# Patient Record
Sex: FEMALE | Race: WHITE | NOT HISPANIC OR LATINO
[De-identification: names, ages, dates, MRNs, and addresses within clinical notes are randomized per-mention and may not be internally consistent; named-entity substitution may affect disease eponyms.]

---

## 2022-12-29 PROBLEM — Z00.00 ENCOUNTER FOR PREVENTIVE HEALTH EXAMINATION: Status: ACTIVE | Noted: 2022-12-29

## 2023-01-17 ENCOUNTER — NON-APPOINTMENT (OUTPATIENT)
Age: 78
End: 2023-01-17

## 2023-01-17 ENCOUNTER — APPOINTMENT (OUTPATIENT)
Dept: NEUROSURGERY | Facility: CLINIC | Age: 78
End: 2023-01-17
Payer: MEDICARE

## 2023-01-17 VITALS
OXYGEN SATURATION: 92 % | SYSTOLIC BLOOD PRESSURE: 136 MMHG | HEIGHT: 64 IN | HEART RATE: 65 BPM | BODY MASS INDEX: 26.46 KG/M2 | WEIGHT: 155 LBS | DIASTOLIC BLOOD PRESSURE: 84 MMHG

## 2023-01-17 DIAGNOSIS — Z83.3 FAMILY HISTORY OF DIABETES MELLITUS: ICD-10-CM

## 2023-01-17 DIAGNOSIS — R25.1 TREMOR, UNSPECIFIED: ICD-10-CM

## 2023-01-17 DIAGNOSIS — Z86.79 PERSONAL HISTORY OF OTHER DISEASES OF THE CIRCULATORY SYSTEM: ICD-10-CM

## 2023-01-17 PROCEDURE — 99204 OFFICE O/P NEW MOD 45 MIN: CPT

## 2023-01-17 RX ORDER — AMLODIPINE BESYLATE 10 MG/1
10 TABLET ORAL
Refills: 0 | Status: ACTIVE | COMMUNITY

## 2023-01-17 RX ORDER — ESCITALOPRAM OXALATE 5 MG/1
5 TABLET ORAL
Refills: 0 | Status: ACTIVE | COMMUNITY

## 2023-01-17 RX ORDER — ATORVASTATIN CALCIUM 10 MG/1
10 TABLET, FILM COATED ORAL
Refills: 0 | Status: ACTIVE | COMMUNITY

## 2023-01-17 RX ORDER — CETIRIZINE HYDROCHLORIDE 10 MG/1
10 TABLET, COATED ORAL
Refills: 0 | Status: ACTIVE | COMMUNITY

## 2023-01-17 RX ORDER — CHROMIUM 200 MCG
TABLET ORAL
Refills: 0 | Status: ACTIVE | COMMUNITY

## 2023-01-17 RX ORDER — HYDROCHLOROTHIAZIDE 25 MG/1
25 TABLET ORAL
Refills: 0 | Status: ACTIVE | COMMUNITY

## 2023-01-17 RX ORDER — LOSARTAN POTASSIUM 100 MG/1
100 TABLET, FILM COATED ORAL
Refills: 0 | Status: ACTIVE | COMMUNITY

## 2023-01-18 NOTE — ASSESSMENT
[FreeTextEntry1] : IMPRESSION:\par 77F with PMH of HTN. P/w tremor in face, lip, jaw, head, and right hand. Tremor worsens with anxiety and stress, relieved with occasional alcohol use. Family Hx of tremor in mother. No formal diagnosis of essential tremor. Hasn't tried medical therapy. Presents to office today to discuss HIFU therapy. \par \par On exam she has right side tremor including face, jaw, and hand. Has steady gait when ambulating in hallway. She is unable to tandem gait, although she can tandem stance, stand with feet together. She has right postural and action tremor. \par \par MRI guided focused ultrasound improves tremor but is only able to be performed unilaterally. Focused ultrasound therapy is not beneficial for head tremor as lesions would be needed on both side of brain. It would not improve this symptom that she is having. DBS can be done bilaterally.  HIFU therapy creates an irreversible lesion. She denies cardiac problems other than HTN. Not on antiplatelets/anticoagulants.\par \par I have discussed in detail the process of DBS surgery. I discussed that the surgery will take place in two different stages, Stage I and Stage II that it would take place at [facility]. The patient would be under local anesthesia and would have a stereotactic frame placed around the head prior to having a head CT stealth done before proceeding with the placement of the electrodes. The patient would have a routine post op head CT again after surgery and would spend the night, and would likely go home the following day with PO pain meds, and about a week of scheduled antibiotics.\par \par The patient would return to the hospital ~ 2 weeks later for the Stage II procedure, which would be the subcutaneous implantation of the pulse generator and lead extension and would be done under general anesthesia. This would be an outpatient procedure and the patient would go home that same day along with pain meds and scheduled antibiotics for ~1 week.\par \par I discussed the risk and benefits of the procedure including bleeding, infection, seizures, stroke, heart or lung issues, no benefit, the need for reoperations, hardware malfunction or failure, and sustained side effects including paralysis, paresthesias, eye deviations, mood, changes, and voice changes. I also discussed the need for several programing sessions afterwards.\par \par \par PLAN:\par Refer to movement disorder neurologist Dr. Hansen who may order medication to manage tremor. This may improve tremor but may cause systemic side effects including sedation. Tremor is not always responsive to medical treatment, and is not always tolerated.\par No surgery to be done at this time. If she fails medical therapy, can consider other more invasive treatment options like deep brain stimulation. \par Recommend following up with PCP regarding weight gain.\par Can call office or schedule visit for further discussion of these treatment options.

## 2023-01-18 NOTE — HISTORY OF PRESENT ILLNESS
[> 3 months] : more  than 3 months [FreeTextEntry1] : tremors [de-identified] : KASSI SYED is a 77 year old female with PMH of hypertension. About 2 years ago her spouse had a stroke and she was experiencing significant stress. At this time her son started noticing that when she gets nervous she starts getting violent tremors in her face, lips, cheeks, jaw and well as in right hand. She has head tremor and now it affects her voice. Tremors worsen with anxiety and fatigue and are not affected by coffee/caffeine. When she has an occasional glass of wine it calms the tremor. She has never been formally diagnosed with essential tremor and doesn't see neurologist. She has not yet tried medication for this. Her mother had tremor that was never diagnosed.  She states her balance is bad. Used to dance, and travel, and is unable to do these things now that her balance is off. Denies recent falls. Does not use any ambulation aids.

## 2023-01-18 NOTE — REVIEW OF SYSTEMS
[Feeling Tired] : feeling tired [As Noted in HPI] : as noted in HPI [Ataxia] : ataxia [Negative] : Heme/Lymph [Recent Weight Gain (___ Lbs)] : recent [unfilled] ~Ulb weight gain [Suicidal] : not suicidal [FreeTextEntry2] : over the past couple years [de-identified] : tremor

## 2023-01-18 NOTE — PHYSICAL EXAM
[General Appearance - Alert] : alert [General Appearance - In No Acute Distress] : in no acute distress [Person] : oriented to person [Place] : oriented to place [Time] : oriented to time [Cranial Nerves Oculomotor (III)] : extraocular motion intact [Cranial Nerves Trigeminal (V)] : facial sensation intact symmetrically [Cranial Nerves Facial (VII)] : face symmetrical [Cranial Nerves Vestibulocochlear (VIII)] : hearing was intact bilaterally [Cranial Nerves Glossopharyngeal (IX)] : tongue and palate midline [Cranial Nerves Accessory (XI - Cranial And Spinal)] : head turning and shoulder shrug symmetric [Cranial Nerves Hypoglossal (XII)] : there was no tongue deviation with protrusion [Motor Strength] : muscle strength was normal in all four extremities [Sensation Tactile Decrease] : light touch was intact [Tremor] : a tremor present [] : no respiratory distress [Respiration, Rhythm And Depth] : normal respiratory rhythm and effort [Abnormal Walk] : normal gait [Limited Balance] : the patient's balance was impaired [FreeTextEntry8] : head bobbing, slight right hand tremor in winged position. Unable to tandem gait, Able to tandem stance, able to stand with feet together. Normal gait when ambulating in hallway. [FreeTextEntry1] : when ambulating

## 2023-03-07 ENCOUNTER — APPOINTMENT (OUTPATIENT)
Dept: NEUROLOGY | Facility: CLINIC | Age: 78
End: 2023-03-07

## 2023-04-12 ENCOUNTER — NON-APPOINTMENT (OUTPATIENT)
Age: 78
End: 2023-04-12

## 2023-04-12 ENCOUNTER — APPOINTMENT (OUTPATIENT)
Dept: NEUROLOGY | Facility: CLINIC | Age: 78
End: 2023-04-12
Payer: MEDICARE

## 2023-04-12 VITALS
SYSTOLIC BLOOD PRESSURE: 147 MMHG | BODY MASS INDEX: 27.31 KG/M2 | WEIGHT: 160 LBS | DIASTOLIC BLOOD PRESSURE: 77 MMHG | HEART RATE: 71 BPM | HEIGHT: 64 IN

## 2023-04-12 DIAGNOSIS — Z82.0 FAMILY HISTORY OF EPILEPSY AND OTHER DISEASES OF THE NERVOUS SYSTEM: ICD-10-CM

## 2023-04-12 PROCEDURE — 99205 OFFICE O/P NEW HI 60 MIN: CPT

## 2023-04-12 NOTE — PHYSICAL EXAM
[FreeTextEntry1] : MS: Awake, alert, oriented to person, place, situation and time. Follows commands. \par \par Language: Speech is clear, fluent. No dysarthria. Speech is mildly affected by tremor\par \par CNs 2 - 12 intact. EOMI no nystagmus, no diplopia. No facial asymmetry b/l. Tongue midline, normal movements, no atrophy. Normal saccadic, pursue eye movements, no apraxia or ataxia. \par Bilateral, perioral facial spasms, and nasalis/zygomaticus region\par Head is tilted to the Rt, and no-no head tremor predominant, though at times appears yes-yes. No exacerbation of tremors when turning head. Tremors temporarily stop when patient is concentrating. \par MIld voice tremors.\par \par Motor: Normal muscle bulk & tone. No seizure. No pronator drift. Muscle strength of b/l UE and b/l LE 5/5. No positional tremor, bradykinesia\par \par Sensation: Intact to LT b/l throughout\par \par Cortical: No extinction\par \par Coordination: Intact rapid-alternating movements. No dysmetria to finger to nose. \par \par DTR: 2+ in biceps, brachioradialis and triceps; 2+ in patellar and ankle; plantars are down b/l\par \par Gait: No postural instability. Normal stance and tandem gait. Normal postural reflexes, normal arm swing during walking. \par \par \par

## 2023-04-12 NOTE — HISTORY OF PRESENT ILLNESS
[FreeTextEntry1] : KASSI SYED is a 77 year old female with PMH of hypertension. She presents for initial evaluation in the Movement Disorders Clinic at [facility]. She is accompanied by her son who provides collateral history. She was referred by Dr. Wolfe for Essential tremors. \par \par About 3 years ago her spouse had a stroke and she was experiencing significant stress. At that time her tremor started. The patient states that the tremor is getting better, but the son states it is getting worse. \par She starts getting violent tremors in her face, lips, cheeks, and well as in right hand. These movements disappear during sleep. \par She has head tremor and it affects her voice. Tremors worsen with anxiety and fatigue and are not affected by coffee/caffeine. The movements are absent when she is relaxed and in a happy environment, but the voice never back to its normal. \par When she has an occasional glass of wine it calms the tremor; however, it is not clear effect of the wine or her being relaxed. \par She has never been formally diagnosed with essential tremor and doesn't see neurologist. \par She has not yet tried medication for this. \par Her mother had tremor that was never diagnosed. \par She states her balance is bad. Used to dance, and travel, and is unable to do these things now that her balance is off. She is not able to ride a bike any more. It does not affect her walking, downstairs or upstairs, it is only when dancing and bike riding, but she feels her balance changed. \par Denies recent falls. Does not use any ambulation aids.\par \par No headache,e blurry vision, diplopia, dysphagia, or dysphasia. \par No hallucinations, aggressin, agitation, or mood changes. \par \par Sleep is good\par No constipation, No falls\par Mood is good \par

## 2023-04-12 NOTE — ASSESSMENT
[FreeTextEntry1] : KASSI SYED is a 77 year old female with PMH of hypertension. She presents for initial evaluation in the Movement Disorders Clinic at [facility]. She is accompanied by her son who provides collateral history. She was referred by Dr. Wolfe for Essential tremors. \par \par Patient with likely essential tremors of the head, no clear dystonia. Mild voice tremor. She also has bilateral facial spasms, unclear etiology. She has not tried medications in the past for this indication. The patient also has a significant amount of anxiety that is likely exacerbating her involuntary movements.\par \par Plan: \par Baclofen 5 mg tab twice daily. Advised to start on a day when she is not driving.\par If no response, or side effects from baclofen, will give a trial of propranolol. Will have to discussed with her PCP regarding BP meds adjustment.\par Increase Escitalopram to 10 mg daily to control her anxiety and stress\par Will Consider Botox for the facial spasm depending on the patient response to Baclofen (will work on authorization)\par MR brain with and without contrast to rule out any intracranial lesions.\par \par RTC 2 months via telehealth and 6 months in person\par \par All questions were answered to their satisfaction. I spent 60 minutes on this encounter.

## 2023-05-23 ENCOUNTER — APPOINTMENT (OUTPATIENT)
Dept: NEUROLOGY | Facility: CLINIC | Age: 78
End: 2023-05-23
Payer: MEDICARE

## 2023-05-23 PROCEDURE — 99213 OFFICE O/P EST LOW 20 MIN: CPT | Mod: 95

## 2023-05-23 RX ORDER — ESCITALOPRAM OXALATE 10 MG/1
10 TABLET ORAL DAILY
Qty: 90 | Refills: 2 | Status: DISCONTINUED | COMMUNITY
Start: 2023-04-12 | End: 2023-05-23

## 2023-05-23 RX ORDER — BACLOFEN 5 MG/1
5 TABLET ORAL
Qty: 540 | Refills: 3 | Status: ACTIVE | COMMUNITY
Start: 2023-04-12 | End: 1900-01-01

## 2023-05-23 NOTE — DISCUSSION/SUMMARY
[FreeTextEntry1] : KASSI SYED is a 77 year old female with PMH of hypertension. She presents for follow up in the Movement Disorders Clinic at [facility] via telehealth. She is accompanied by her son who provides collateral history. She was initially referred by Dr. Wolfe for Essential tremors. \par \par The patient's history and physical examination are concerning for likely Essential Tremors, however she also has involuntary facial movements. There appears to be a mild response to Baclofen, no side effects reported. Given both tremors and facial movements, baclofen may address both. \par Currently, patient reports anxiety is controlled on Lexapro 5mg daily.\par \par Plan: \par Increase Baclofen from 5 mg to 10mg twice daily. \par If no response, or side effects from baclofen, will give a trial of propranolol. Patient reports PCP approved.\par Continue Escitalopram 5 mg daily to control her anxiety and stress\par Will Consider Botox for the facial spasm depending on the patient response to Baclofen \par MR brain with and without contrast to rule out any intracranial lesions.\par \par RTC in 3 months via telehealth and as scheduled in clinic in December\par \par All questions were answered to their satisfaction. I spent 20 minutes on this encounter.

## 2023-05-23 NOTE — PHYSICAL EXAM
[Person] : oriented to person [Place] : oriented to place [Time] : oriented to time [Concentration Intact] : normal concentrating ability [Comprehension] : comprehension intact [Cranial Nerves Oculomotor (III)] : extraocular motion intact [Cranial Nerves Facial (VII)] : face symmetrical [Cranial Nerves Hypoglossal (XII)] : there was no tongue deviation with protrusion [Motor Strength] : muscle strength was normal in all four extremities [Paresis Pronator Drift Right-Sided] : no pronator drift on the right [Paresis Pronator Drift Left-Sided] : no pronator drift on the left [FreeTextEntry1] : Mild orbicularis oris involuntary movements. No spasms noted around nasalis or zygomaticus region. \par Mild no-no head tremor. Slight head tilt tot he right.\par Mild vocal tremor.\par \par Gait deferred\par

## 2023-05-23 NOTE — HISTORY OF PRESENT ILLNESS
[FreeTextEntry1] : KASSI SYED is a 77 year old female with PMH of hypertension. She presents for follow up in the Movement Disorders Clinic at [facility] via telehealth. She is accompanied by her son who provides collateral history. She was initially referred by Dr. Wolfe for Essential tremors. \par \par Interval history:\par Since the last visit, she feels her tremors are better. Her son notes slight improvement. No side effects from Baclofen so far. The facial spasms are better. \par She continues to take Lexapro 5mg daily. She does not feel stressed.\par She has an appointment at the end of this month for MRI brain.\par \par Initial history:\par About 3 years ago her spouse had a stroke and she was experiencing significant stress. At that time her tremor started. The patient states that the tremor is getting better, but the son states it is getting worse. \par She starts getting violent tremors in her face, lips, cheeks, and well as in right hand. These movements disappear during sleep. \par She has head tremor and it affects her voice. Tremors worsen with anxiety and fatigue and are not affected by coffee/caffeine. The movements are absent when she is relaxed and in a happy environment, but the voice never back to its normal. \par When she has an occasional glass of wine it calms the tremor; however, it is not clear effect of the wine or her being relaxed. \par She has never been formally diagnosed with essential tremor and doesn't see neurologist. \par She has not yet tried medication for this. \par Her mother had tremor that was never diagnosed. \par She states her balance is bad. Used to dance, and travel, and is unable to do these things now that her balance is off. She is not able to ride a bike any more. It does not affect her walking, downstairs or upstairs, it is only when dancing and bike riding, but she feels her balance changed. \par Denies recent falls. Does not use any ambulation aids.\par \par No headache,e blurry vision, diplopia, dysphagia, or dysphasia. \par No hallucinations, aggressin, agitation, or mood changes. \par \par Sleep is good\par No constipation, No falls\par Mood is good \par

## 2023-06-09 ENCOUNTER — RESULT REVIEW (OUTPATIENT)
Age: 78
End: 2023-06-09

## 2023-06-09 ENCOUNTER — OUTPATIENT (OUTPATIENT)
Dept: OUTPATIENT SERVICES | Facility: HOSPITAL | Age: 78
LOS: 1 days | End: 2023-06-09
Payer: MEDICARE

## 2023-06-09 DIAGNOSIS — Z00.8 ENCOUNTER FOR OTHER GENERAL EXAMINATION: ICD-10-CM

## 2023-06-09 DIAGNOSIS — R51.9 HEADACHE, UNSPECIFIED: ICD-10-CM

## 2023-06-09 PROCEDURE — A9579: CPT

## 2023-06-09 PROCEDURE — 70553 MRI BRAIN STEM W/O & W/DYE: CPT | Mod: 26,MH

## 2023-06-09 PROCEDURE — 70553 MRI BRAIN STEM W/O & W/DYE: CPT

## 2023-06-10 DIAGNOSIS — R51.9 HEADACHE, UNSPECIFIED: ICD-10-CM

## 2023-06-15 ENCOUNTER — NON-APPOINTMENT (OUTPATIENT)
Age: 78
End: 2023-06-15

## 2023-06-16 ENCOUNTER — NON-APPOINTMENT (OUTPATIENT)
Age: 78
End: 2023-06-16

## 2023-08-15 ENCOUNTER — APPOINTMENT (OUTPATIENT)
Dept: NEUROLOGY | Facility: CLINIC | Age: 78
End: 2023-08-15
Payer: MEDICARE

## 2023-08-15 DIAGNOSIS — R41.3 OTHER AMNESIA: ICD-10-CM

## 2023-08-15 PROCEDURE — 99213 OFFICE O/P EST LOW 20 MIN: CPT | Mod: 95

## 2023-08-15 RX ORDER — PRIMIDONE 50 MG/1
50 TABLET ORAL
Qty: 180 | Refills: 3 | Status: ACTIVE | COMMUNITY
Start: 2023-08-15 | End: 1900-01-01

## 2023-08-15 NOTE — DISCUSSION/SUMMARY
[FreeTextEntry1] : KASSI SYED is a 77 year old female with PMH of hypertension. She presents for follow up in the Movement Disorders Clinic at [facility] via telehealth. She is accompanied by her son who provides collateral history. She was initially referred by Dr. Wolfe for Essential tremors. She is accompanied by her son, Jerome.   The patient's history and physical examination are concerning for likely Essential Tremors, however she also has involuntary facial movements. There appears to be a mild response to Baclofen, no side effects reported at 10mg BID but she would like to try an alternative treatment.   Currently, patient reports anxiety is controlled on Lexapro 5mg daily. However, she is not sleeping well. Discussed medication options and she has opted for a trial of Primidone.  Plan:  Taper Baclofen from 10mg BID to 5mg BID x 5 days then stop Start Primidone 50mg at bedtime x 2 weeks, can increase to 100mg at bedtime if needed PCP did approve propranolol if needed.  Continue Escitalopram 5 mg daily to control her anxiety and stress Will Consider Botox for the facial spasm, however she is not interested in this at this time.  MR brain with and without contrast showed chronic white matter disease and possible infarct, recommended to repeat in 6 months (12/2023) Referral to Neuropsychology for memory testing. She reports her B12 and TSH were normal.   RTC in 2 months via telehealth and as scheduled in clinic in December  All questions were answered to their satisfaction. I spent 20 minutes on this encounter.

## 2023-08-15 NOTE — HISTORY OF PRESENT ILLNESS
[FreeTextEntry1] : KASSI SYED is a 77 year old female with PMH of hypertension. She presents for follow up in the Movement Disorders Clinic at [facility] via telehealth. She is accompanied by her son who provides collateral history. She was initially referred by Dr. Wolfe for Essential tremors. She is accompanied by her son, Jerome.   Interval history: Since the last visit, she feels okay. She states that the medication does not start working very quickly. She continues to have facial twitching and head tremors. She is also not sleeping well. She has tried Baclofen 10mg BID, and while no side effects, she has not noticed much improvement.  Initial history: About 3 years ago her spouse had a stroke and she was experiencing significant stress. At that time her tremor started. The patient states that the tremor is getting better, but the son states it is getting worse.  She starts getting violent tremors in her face, lips, cheeks, and well as in right hand. These movements disappear during sleep.  She has head tremor and it affects her voice. Tremors worsen with anxiety and fatigue and are not affected by coffee/caffeine. The movements are absent when she is relaxed and in a happy environment, but the voice never back to its normal.  When she has an occasional glass of wine it calms the tremor; however, it is not clear effect of the wine or her being relaxed.  She has never been formally diagnosed with essential tremor and doesn't see neurologist.  She has not yet tried medication for this.  Her mother had tremor that was never diagnosed.  She states her balance is bad. Used to dance, and travel, and is unable to do these things now that her balance is off. She is not able to ride a bike any more. It does not affect her walking, downstairs or upstairs, it is only when dancing and bike riding, but she feels her balance changed.  Denies recent falls. Does not use any ambulation aids.  No headache,e blurry vision, diplopia, dysphagia, or dysphasia.  No hallucinations, aggression, agitation, or mood changes.   Sleep is good No constipation, No falls Mood is good

## 2023-08-15 NOTE — PHYSICAL EXAM
[Person] : oriented to person [Place] : oriented to place [Time] : oriented to time [Concentration Intact] : normal concentrating ability [Comprehension] : comprehension intact [Cranial Nerves Oculomotor (III)] : extraocular motion intact [Cranial Nerves Facial (VII)] : face symmetrical [Cranial Nerves Hypoglossal (XII)] : there was no tongue deviation with protrusion [Motor Strength] : muscle strength was normal in all four extremities [Paresis Pronator Drift Right-Sided] : no pronator drift on the right [Paresis Pronator Drift Left-Sided] : no pronator drift on the left [FreeTextEntry1] : Mild orbicularis oris involuntary movements and orbicularis oculi, lower lid.  Mild head tremor, mixed, appears yes-yes and no-no. Slight head turning to the right. Mild vocal tremor.  Gait deferred

## 2023-10-27 ENCOUNTER — APPOINTMENT (OUTPATIENT)
Dept: NEUROLOGY | Facility: CLINIC | Age: 78
End: 2023-10-27
Payer: MEDICARE

## 2023-10-27 DIAGNOSIS — G25.0 ESSENTIAL TREMOR: ICD-10-CM

## 2023-10-27 DIAGNOSIS — G51.39 CLONIC HEMIFACIAL SPASM, UNSPECIFIED: ICD-10-CM

## 2023-10-27 PROCEDURE — 99214 OFFICE O/P EST MOD 30 MIN: CPT | Mod: 95

## 2023-12-27 ENCOUNTER — APPOINTMENT (OUTPATIENT)
Dept: NEUROLOGY | Facility: CLINIC | Age: 78
End: 2023-12-27

## 2024-01-23 ENCOUNTER — RX RENEWAL (OUTPATIENT)
Age: 79
End: 2024-01-23

## 2024-01-23 RX ORDER — PROPRANOLOL HYDROCHLORIDE 10 MG/1
10 TABLET ORAL
Qty: 180 | Refills: 0 | Status: ACTIVE | COMMUNITY
Start: 2023-10-27 | End: 1900-01-01